# Patient Record
Sex: FEMALE | Race: WHITE | URBAN - METROPOLITAN AREA
[De-identification: names, ages, dates, MRNs, and addresses within clinical notes are randomized per-mention and may not be internally consistent; named-entity substitution may affect disease eponyms.]

---

## 2018-01-02 NOTE — PATIENT DISCUSSION
PER PT HX HER VA WAS MADE WORSE S/P CAT SX OS PERFORMED ELSEWHERE. LIKELY DUE TO POAG. WILL EVAL HVF/OCT AND MAY REFER TO DR. Jess Castro FOR COMBINED CAT AND POAG SX PENDING RESULTS.

## 2018-01-02 NOTE — PATIENT DISCUSSION
POAG, OU- IOP WNL, OU. CONTINUE PRESENT TX. OCT NEXT VISIT TO CHECK FOR RNFL THINNING. VISUAL FIELD NEXT VISIT TO CHECK FOR VISUAL FIELD LOSS.

## 2018-01-02 NOTE — PATIENT DISCUSSION
DIABETES- W/ NPDR S/P FOCAL LASER OU. ENCOURAGED GOOD BS CONTROL AND  CONSULT DR. ARORA FOR FURTHER EVAL/POSS TX.

## 2018-01-02 NOTE — PATIENT DISCUSSION
Continue: dorzolamide-timolol (dorzolamide-timolol): drops: 2-0.5% 1 drop twice a day as directed into both eyes

## 2018-01-02 NOTE — PATIENT DISCUSSION
PER PT HX HER VA WAS MADE WORSE S/P CAT SX OS PERFORMED ELSEWHERE. LIKELY DUE TO POAG. WILL EVAL HVF/OCT AND MAY REFER TO DR. Geraldine Sultana FOR COMBINED CAT AND POAG SX PENDING RESULTS.

## 2018-02-05 NOTE — PATIENT DISCUSSION
CLEARED FOR PHACO OD. FOLLOW-UP WITH DR Mary Gutierres FOR PHACO AND GLAUCOMA EVAL (POSSIBLE COMBINED PROCEDURE).

## 2018-02-05 NOTE — PATIENT DISCUSSION
STABLE NON-EXUDATIVE AMD, OU:  CONTINUE AREDS 2 VITAMINS / AMSLER GRID QD/ UV PROTECTION. SMOKING AVOIDANCE REVIEWED. RETURN FOR FOLLOW-UP AS SCHEDULED.

## 2018-02-05 NOTE — PATIENT DISCUSSION
NONPROLIFERATIVE DIABETIC RETINOPATHY:  EXTENSIVE FOCAL LASER SCARS OU; NO TREATMENT INDICATED AT THIS TIME.  RETURN FOR FOLLOW-UP AS SCHEDULED FOR DILATED EYE EXAM.

## 2020-07-07 NOTE — PATIENT DISCUSSION
NONPROLIFERATIVE DIABETIC RETINOPATHY:  I have talked with the patient about the nature of diabetic retinopathy and the potential for significant visual complications. For this reason regular follow-up with an eye doctor is essential.  I have also advised aggressive blood sugar and blood pressure control to minimize the risk of further damage.  Recheck in 6 months - dilate OU and MAcula OCT OD

## 2020-07-07 NOTE — PATIENT DISCUSSION
GLAUCOMA:  I have talked with the patient about my impressions, explained our treatment plan, and have answered all questions and patient understands. Continue present eye drops. Patient to follow up in 6 months.  Dialte and macula OCT OD

## 2021-06-17 NOTE — PATIENT DISCUSSION
ARTIFICIAL TEARS:  The patient is instructed to purchase and utilize high quality artificial tears for symptomatic relief of dry eye syndrome. The patient should use the drops as often as necessary to relieve the symptoms of dry eye including burning, irritation, aching, tired eyes, and blurring of vision when reading.

## 2021-06-17 NOTE — PATIENT DISCUSSION
Continue: dorzolamide-timolol (dorzolamide-timolol): drops: 2-0.5% 1 drop twice a day as directed into right eye 06-

## 2022-02-04 ENCOUNTER — CONSULTATION/EVALUATION (OUTPATIENT)
Dept: URBAN - METROPOLITAN AREA CLINIC 20 | Facility: CLINIC | Age: 52
End: 2022-02-04

## 2022-02-04 DIAGNOSIS — H52.4: ICD-10-CM

## 2022-02-04 PROCEDURE — 99499LK REFRACTIVE CONSULT/LASIK

## 2022-02-04 PROCEDURE — 92025 CPTRIZED CORNEAL TOPOGRAPHY: CPT

## 2022-02-04 PROCEDURE — 76514 ECHO EXAM OF EYE THICKNESS: CPT

## 2022-02-04 ASSESSMENT — VISUAL ACUITY
OU_CC: J1+
OS_CC: 20/20
OU_SC: J1
OD_CC: 20/25-1
OD_SC: 20/60-1
OS_SC: 20/100-1

## 2022-02-04 ASSESSMENT — KERATOMETRY
OD_K1POWER_DIOPTERS: 45.25
OD_AXISANGLE2_DEGREES: 29
OS_AXISANGLE_DEGREES: 43
OS_AXISANGLE2_DEGREES: 135
OD_AXISANGLE2_DEGREES: 25
OD_AXISANGLE_DEGREES: 115
OD_AXISANGLE_DEGREES: 113
OD_K2POWER_DIOPTERS: 46.00
OD_AXISANGLE2_DEGREES: 23
OD_AXISANGLE_DEGREES: 119
OD_K1POWER_DIOPTERS: 45.50
OS_K1POWER_DIOPTERS: 44.75
OS_AXISANGLE2_DEGREES: 141
OS_AXISANGLE_DEGREES: 51
OD_K2POWER_DIOPTERS: 46.25
OS_AXISANGLE_DEGREES: 45
OS_K2POWER_DIOPTERS: 45.50
OS_K2POWER_DIOPTERS: 45.25
OS_AXISANGLE2_DEGREES: 133

## 2022-02-04 ASSESSMENT — PACHYMETRY
OS_CT_UM: 552
OD_CT_UM: 547

## 2022-02-04 NOTE — PATIENT DISCUSSION
PATIENT IS CONSIDERING JUAN OU, PLANNING FOR SUMMER 2022. PATIENT WILL RETURN FOR REPEAT DEONTE/CASSINI/MRX OU IN 1-2 MONTHS AND PRE-OP/IOL .